# Patient Record
Sex: FEMALE
[De-identification: names, ages, dates, MRNs, and addresses within clinical notes are randomized per-mention and may not be internally consistent; named-entity substitution may affect disease eponyms.]

---

## 2017-01-30 ENCOUNTER — LAB SERVICES (OUTPATIENT)
Dept: OTHER | Age: 42
End: 2017-01-30

## 2017-01-30 ENCOUNTER — CHARTING TRANS (OUTPATIENT)
Dept: OTHER | Age: 42
End: 2017-01-30

## 2017-01-30 LAB — RAPID STREP GROUP A: POSITIVE

## 2018-09-28 ENCOUNTER — RECORDS - HEALTHEAST (OUTPATIENT)
Dept: ADMINISTRATIVE | Facility: OTHER | Age: 43
End: 2018-09-28

## 2018-10-16 ENCOUNTER — RECORDS - HEALTHEAST (OUTPATIENT)
Dept: ADMINISTRATIVE | Facility: OTHER | Age: 43
End: 2018-10-16

## 2018-11-05 VITALS — TEMPERATURE: 99.2 F | HEART RATE: 82 BPM | OXYGEN SATURATION: 98 %

## 2019-09-30 ENCOUNTER — RECORDS - HEALTHEAST (OUTPATIENT)
Dept: ADMINISTRATIVE | Facility: OTHER | Age: 44
End: 2019-09-30

## 2019-12-31 ENCOUNTER — COMMUNICATION - HEALTHEAST (OUTPATIENT)
Dept: TELEHEALTH | Facility: CLINIC | Age: 44
End: 2019-12-31

## 2019-12-31 ENCOUNTER — OFFICE VISIT - HEALTHEAST (OUTPATIENT)
Dept: FAMILY MEDICINE | Facility: CLINIC | Age: 44
End: 2019-12-31

## 2019-12-31 DIAGNOSIS — Z00.00 ROUTINE GENERAL MEDICAL EXAMINATION AT A HEALTH CARE FACILITY: ICD-10-CM

## 2019-12-31 DIAGNOSIS — J30.9 ALLERGIC RHINITIS, UNSPECIFIED SEASONALITY, UNSPECIFIED TRIGGER: ICD-10-CM

## 2019-12-31 DIAGNOSIS — E66.9 OBESITY (BMI 30-39.9): ICD-10-CM

## 2019-12-31 DIAGNOSIS — Z12.4 ENCOUNTER FOR SCREENING FOR CERVICAL CANCER: ICD-10-CM

## 2019-12-31 ASSESSMENT — MIFFLIN-ST. JEOR: SCORE: 1725.78

## 2020-01-02 ENCOUNTER — COMMUNICATION - HEALTHEAST (OUTPATIENT)
Dept: FAMILY MEDICINE | Facility: CLINIC | Age: 45
End: 2020-01-02

## 2020-01-02 DIAGNOSIS — R92.8 ABNORMAL MAMMOGRAM: ICD-10-CM

## 2020-01-02 LAB
HPV SOURCE: NORMAL
HUMAN PAPILLOMA VIRUS 16 DNA: NEGATIVE
HUMAN PAPILLOMA VIRUS 18 DNA: NEGATIVE
HUMAN PAPILLOMA VIRUS FINAL DIAGNOSIS: NORMAL
HUMAN PAPILLOMA VIRUS OTHER HR: NEGATIVE
SPECIMEN DESCRIPTION: NORMAL

## 2020-01-07 ENCOUNTER — COMMUNICATION - HEALTHEAST (OUTPATIENT)
Dept: FAMILY MEDICINE | Facility: CLINIC | Age: 45
End: 2020-01-07

## 2020-01-07 LAB
BKR LAB AP ABNORMAL BLEEDING: NO
BKR LAB AP BIRTH CONTROL/HORMONES: NORMAL
BKR LAB AP CERVICAL APPEARANCE: NORMAL
BKR LAB AP GYN ADEQUACY: NORMAL
BKR LAB AP GYN INTERPRETATION: NORMAL
BKR LAB AP HPV REFLEX: NORMAL
BKR LAB AP LMP: NORMAL
BKR LAB AP PATIENT STATUS: NORMAL
BKR LAB AP PREVIOUS ABNORMAL: NO
BKR LAB AP PREVIOUS NORMAL: NORMAL
HIGH RISK?: NO
PATH REPORT.COMMENTS IMP SPEC: NORMAL
RESULT FLAG (HE HISTORICAL CONVERSION): NORMAL

## 2020-01-29 ENCOUNTER — OFFICE VISIT - HEALTHEAST (OUTPATIENT)
Dept: FAMILY MEDICINE | Facility: CLINIC | Age: 45
End: 2020-01-29

## 2020-01-29 DIAGNOSIS — E66.811 CLASS 1 OBESITY DUE TO EXCESS CALORIES WITHOUT SERIOUS COMORBIDITY WITH BODY MASS INDEX (BMI) OF 33.0 TO 33.9 IN ADULT: ICD-10-CM

## 2020-01-29 DIAGNOSIS — Z23 NEED FOR VACCINATION: ICD-10-CM

## 2020-01-29 DIAGNOSIS — E66.09 CLASS 1 OBESITY DUE TO EXCESS CALORIES WITHOUT SERIOUS COMORBIDITY WITH BODY MASS INDEX (BMI) OF 33.0 TO 33.9 IN ADULT: ICD-10-CM

## 2020-01-29 LAB
ALBUMIN SERPL-MCNC: 3.9 G/DL (ref 3.5–5)
ALP SERPL-CCNC: 62 U/L (ref 45–120)
ALT SERPL W P-5'-P-CCNC: 14 U/L (ref 0–45)
ANION GAP SERPL CALCULATED.3IONS-SCNC: 9 MMOL/L (ref 5–18)
AST SERPL W P-5'-P-CCNC: 14 U/L (ref 0–40)
ATRIAL RATE - MUSE: 46 BPM
BILIRUB SERPL-MCNC: 0.5 MG/DL (ref 0–1)
BUN SERPL-MCNC: 16 MG/DL (ref 8–22)
CALCIUM SERPL-MCNC: 9.3 MG/DL (ref 8.5–10.5)
CHLORIDE BLD-SCNC: 105 MMOL/L (ref 98–107)
CHOLEST SERPL-MCNC: 162 MG/DL
CO2 SERPL-SCNC: 26 MMOL/L (ref 22–31)
CREAT SERPL-MCNC: 0.71 MG/DL (ref 0.6–1.1)
DIASTOLIC BLOOD PRESSURE - MUSE: NORMAL
ERYTHROCYTE [DISTWIDTH] IN BLOOD BY AUTOMATED COUNT: 11.4 % (ref 11–14.5)
FASTING STATUS PATIENT QL REPORTED: YES
GFR SERPL CREATININE-BSD FRML MDRD: >60 ML/MIN/1.73M2
GLUCOSE BLD-MCNC: 85 MG/DL (ref 70–125)
HBA1C MFR BLD: 5 % (ref 3.5–6)
HCT VFR BLD AUTO: 40.3 % (ref 35–47)
HDLC SERPL-MCNC: 54 MG/DL
HGB BLD-MCNC: 13.5 G/DL (ref 12–16)
INSULIN SERPL-ACNC: 4.7 MU/L (ref 3–25)
INTERPRETATION ECG - MUSE: NORMAL
LDLC SERPL CALC-MCNC: 99 MG/DL
MCH RBC QN AUTO: 32.8 PG (ref 27–34)
MCHC RBC AUTO-ENTMCNC: 33.6 G/DL (ref 32–36)
MCV RBC AUTO: 97 FL (ref 80–100)
P AXIS - MUSE: 16 DEGREES
PLATELET # BLD AUTO: 304 THOU/UL (ref 140–440)
PMV BLD AUTO: 7.7 FL (ref 7–10)
POTASSIUM BLD-SCNC: 4.8 MMOL/L (ref 3.5–5)
PR INTERVAL - MUSE: 202 MS
PROT SERPL-MCNC: 6.6 G/DL (ref 6–8)
QRS DURATION - MUSE: 86 MS
QT - MUSE: 428 MS
QTC - MUSE: 374 MS
R AXIS - MUSE: -6 DEGREES
RBC # BLD AUTO: 4.13 MILL/UL (ref 3.8–5.4)
SODIUM SERPL-SCNC: 140 MMOL/L (ref 136–145)
SYSTOLIC BLOOD PRESSURE - MUSE: NORMAL
T AXIS - MUSE: 1 DEGREES
TRIGL SERPL-MCNC: 47 MG/DL
TSH SERPL DL<=0.005 MIU/L-ACNC: 1.15 UIU/ML (ref 0.3–5)
VENTRICULAR RATE- MUSE: 46 BPM
WBC: 5.1 THOU/UL (ref 4–11)

## 2020-01-29 ASSESSMENT — MIFFLIN-ST. JEOR: SCORE: 1709.46

## 2020-01-30 LAB
25(OH)D3 SERPL-MCNC: 29.9 NG/ML (ref 30–80)
25(OH)D3 SERPL-MCNC: 29.9 NG/ML (ref 30–80)

## 2020-02-03 ENCOUNTER — AMBULATORY - HEALTHEAST (OUTPATIENT)
Dept: FAMILY MEDICINE | Facility: CLINIC | Age: 45
End: 2020-02-03

## 2020-02-03 ENCOUNTER — COMMUNICATION - HEALTHEAST (OUTPATIENT)
Dept: FAMILY MEDICINE | Facility: CLINIC | Age: 45
End: 2020-02-03

## 2020-02-03 DIAGNOSIS — E66.811 CLASS 1 OBESITY DUE TO EXCESS CALORIES WITHOUT SERIOUS COMORBIDITY WITH BODY MASS INDEX (BMI) OF 33.0 TO 33.9 IN ADULT: ICD-10-CM

## 2020-02-03 DIAGNOSIS — E66.09 CLASS 1 OBESITY DUE TO EXCESS CALORIES WITHOUT SERIOUS COMORBIDITY WITH BODY MASS INDEX (BMI) OF 33.0 TO 33.9 IN ADULT: ICD-10-CM

## 2020-03-18 ENCOUNTER — COMMUNICATION - HEALTHEAST (OUTPATIENT)
Dept: FAMILY MEDICINE | Facility: CLINIC | Age: 45
End: 2020-03-18

## 2020-03-20 ENCOUNTER — COMMUNICATION - HEALTHEAST (OUTPATIENT)
Dept: FAMILY MEDICINE | Facility: CLINIC | Age: 45
End: 2020-03-20

## 2020-03-23 ENCOUNTER — OFFICE VISIT - HEALTHEAST (OUTPATIENT)
Dept: SURGERY | Facility: CLINIC | Age: 45
End: 2020-03-23

## 2020-03-23 DIAGNOSIS — E66.9 OBESITY (BMI 30-39.9): ICD-10-CM

## 2020-03-23 ASSESSMENT — MIFFLIN-ST. JEOR: SCORE: 1696.41

## 2020-04-17 ENCOUNTER — RECORDS - HEALTHEAST (OUTPATIENT)
Dept: RADIOLOGY | Facility: CLINIC | Age: 45
End: 2020-04-17

## 2020-04-17 ENCOUNTER — RECORDS - HEALTHEAST (OUTPATIENT)
Dept: ADMINISTRATIVE | Facility: OTHER | Age: 45
End: 2020-04-17

## 2020-04-20 ENCOUNTER — HOSPITAL ENCOUNTER (OUTPATIENT)
Dept: MAMMOGRAPHY | Facility: CLINIC | Age: 45
Discharge: HOME | End: 2020-04-20
Attending: FAMILY MEDICINE

## 2020-04-20 DIAGNOSIS — R92.8 ABNORMAL MAMMOGRAM: ICD-10-CM

## 2021-02-01 ENCOUNTER — OFFICE VISIT - HEALTHEAST (OUTPATIENT)
Dept: FAMILY MEDICINE | Facility: CLINIC | Age: 46
End: 2021-02-01

## 2021-02-01 DIAGNOSIS — Z13.220 ENCOUNTER FOR SCREENING FOR LIPOID DISORDERS: ICD-10-CM

## 2021-02-01 DIAGNOSIS — Z12.31 VISIT FOR SCREENING MAMMOGRAM: ICD-10-CM

## 2021-02-01 DIAGNOSIS — Z00.00 ROUTINE GENERAL MEDICAL EXAMINATION AT A HEALTH CARE FACILITY: ICD-10-CM

## 2021-02-01 DIAGNOSIS — E66.9 OBESITY (BMI 30-39.9): ICD-10-CM

## 2021-02-01 DIAGNOSIS — Z13.1 ENCOUNTER FOR SCREENING FOR DIABETES MELLITUS: ICD-10-CM

## 2021-02-01 DIAGNOSIS — M67.40 GANGLION CYST: ICD-10-CM

## 2021-02-01 LAB
CHOLEST SERPL-MCNC: 156 MG/DL
FASTING STATUS PATIENT QL REPORTED: YES
FASTING STATUS PATIENT QL REPORTED: YES
GLUCOSE BLD-MCNC: 83 MG/DL (ref 70–99)
HDLC SERPL-MCNC: 54 MG/DL
HIV 1+2 AB+HIV1 P24 AG SERPL QL IA: NEGATIVE
LDLC SERPL CALC-MCNC: 91 MG/DL
TRIGL SERPL-MCNC: 57 MG/DL

## 2021-02-01 ASSESSMENT — MIFFLIN-ST. JEOR: SCORE: 1736.33

## 2021-02-02 LAB — HCV AB SERPL QL IA: NEGATIVE

## 2021-04-12 ENCOUNTER — HOSPITAL ENCOUNTER (OUTPATIENT)
Dept: MAMMOGRAPHY | Facility: CLINIC | Age: 46
Discharge: HOME | End: 2021-04-12
Attending: FAMILY MEDICINE

## 2021-04-12 ENCOUNTER — OFFICE VISIT - HEALTHEAST (OUTPATIENT)
Dept: FAMILY MEDICINE | Facility: CLINIC | Age: 46
End: 2021-04-12

## 2021-04-12 DIAGNOSIS — L98.9 FOOT LESION: ICD-10-CM

## 2021-04-12 DIAGNOSIS — E66.9 OBESITY (BMI 30-39.9): ICD-10-CM

## 2021-04-12 DIAGNOSIS — Z12.31 VISIT FOR SCREENING MAMMOGRAM: ICD-10-CM

## 2021-04-12 LAB
ATRIAL RATE - MUSE: 54 BPM
DIASTOLIC BLOOD PRESSURE - MUSE: NORMAL
INTERPRETATION ECG - MUSE: NORMAL
P AXIS - MUSE: 12 DEGREES
PR INTERVAL - MUSE: 194 MS
QRS DURATION - MUSE: 84 MS
QT - MUSE: 402 MS
QTC - MUSE: 381 MS
R AXIS - MUSE: -3 DEGREES
SYSTOLIC BLOOD PRESSURE - MUSE: NORMAL
T AXIS - MUSE: -18 DEGREES
VENTRICULAR RATE- MUSE: 54 BPM

## 2021-04-12 ASSESSMENT — MIFFLIN-ST. JEOR: SCORE: 1744.49

## 2021-04-13 ENCOUNTER — COMMUNICATION - HEALTHEAST (OUTPATIENT)
Dept: FAMILY MEDICINE | Facility: CLINIC | Age: 46
End: 2021-04-13

## 2021-04-13 DIAGNOSIS — E66.9 OBESITY (BMI 30-39.9): ICD-10-CM

## 2021-04-13 RX ORDER — PHENTERMINE HYDROCHLORIDE 15 MG/1
15 CAPSULE ORAL EVERY MORNING
Qty: 30 CAPSULE | Refills: 0 | Status: SHIPPED | OUTPATIENT
Start: 2021-04-13

## 2021-05-10 ENCOUNTER — OFFICE VISIT - HEALTHEAST (OUTPATIENT)
Dept: PODIATRY | Facility: CLINIC | Age: 46
End: 2021-05-10

## 2021-05-10 DIAGNOSIS — M72.2 PLANTAR FIBROMATOSIS: ICD-10-CM

## 2021-05-10 ASSESSMENT — MIFFLIN-ST. JEOR: SCORE: 1712.29

## 2021-05-27 VITALS — TEMPERATURE: 98 F | RESPIRATION RATE: 16 BRPM | HEIGHT: 69 IN | BODY MASS INDEX: 32.58 KG/M2 | WEIGHT: 220 LBS

## 2021-06-04 VITALS
HEIGHT: 68 IN | RESPIRATION RATE: 16 BRPM | HEART RATE: 58 BPM | SYSTOLIC BLOOD PRESSURE: 108 MMHG | BODY MASS INDEX: 33.65 KG/M2 | WEIGHT: 222 LBS | DIASTOLIC BLOOD PRESSURE: 64 MMHG

## 2021-06-04 VITALS
BODY MASS INDEX: 34.19 KG/M2 | HEIGHT: 68 IN | WEIGHT: 225.6 LBS | RESPIRATION RATE: 16 BRPM | HEART RATE: 76 BPM | DIASTOLIC BLOOD PRESSURE: 72 MMHG | SYSTOLIC BLOOD PRESSURE: 112 MMHG

## 2021-06-04 VITALS — HEIGHT: 68 IN | BODY MASS INDEX: 33.34 KG/M2 | WEIGHT: 220 LBS

## 2021-06-04 NOTE — PROGRESS NOTES
"FEMALE PREVENTATIVE EXAM    Assessment and Plan:       1. Routine general medical examination at a health care facility  -I had my medical assistant call, and patient needs \"bilateral mammogram additional views\" ordered.  Will reach out to our specialty  to see if we have a similar equivalent mammogram and order.    2. Allergic rhinitis, unspecified seasonality, unspecified trigger  Discussed with patient findings on exam, I do not think she has sinus infection, no antibiotics needed.  Discussed use of daily antihistamine, and she should restart nasal steroid.  Follow-up in 1 month if symptoms persist.    3. Obesity (BMI 30-39.9)  Counseled patient regarding individualized medical weight management, she was given intake packet, she will schedule I hour full appointment with me sometime in the next few months to discuss medical weight management.    4. Encounter for screening for cervical cancer   Pap smear collected today, will inform patient of results when we have them.  - Gynecologic Cytology (PAP Smear)     Next follow up:  Return in about 1 month (around 1/31/2020) for weight loss intake (1hr appt).    Immunization Review  Adult Imm Review: No immunizations due today  BMI: see above    I discussed the following with the patient:   Adult Healthy Living: Importance of regular exercise  Healthy nutrition  Weight loss referral options    I have had an Advance Directives discussion with the patient.    Subjective:   Chief Complaint: Xiomara Mireles is an 44 y.o. female, new to [facility] here for a preventative health visit.     HPI:  Additional concerns:     Patiently recently moved from [place] to [place] in September.  She had previously participated in a weight management program, had been on Qsymia.  However, since moving to [place] she has gained approximately 10 pounds.  She is interested in starting in some sort of weight management program.    Patient also had breast reduction surgery in " "May, 2019.  She had a mammogram done in fall, 2019 prior to leaving, and was told it was \"abnormal\" and was supposed to have another mammogram done, though that was never completed in [place].  She is wondering if she can have this mammogram done here in [place].  There is significant family history of breast cancer, though patient denies any fevers, chills, sweats, breast changes.    Patient was treated for sinus infection via telemetry doc around Thanksgiving time this year.  Her symptoms resolved, however over the last week or so, she has noted some increased pressure which has gotten better, though she continues to note ongoing nasal drainage.  No fevers.    Healthy Habits  Are you taking a daily aspirin? No  Do you typically exercising at least 40 min, 3-4 times per week?  Yes  Do you usually eat at least 4 servings of fruit and vegetables a day, include whole grains and fiber and avoid regularly eating high fat foods? NO  Have you had an eye exam in the past two years? Yes  Do you see a dentist twice per year? Yes  Do you have any concerns regarding sleep? No    Safety Screen  If you own firearms, are they secured in a locked gun cabinet or with trigger locks? The patient does not own any firearms  No data recorded    Review of Systems:  Please see above.  The rest of the review of systems are negative for all systems.     Pap History:   No - age 30-65 PAP every 3 years recommended  Cancer Screening       Status Date      PAP SMEAR Overdue 12/6/2001           Patient Care Team:  Provider, No Primary Care as PCP - General        History     Reviewed By Date/Time Sections Reviewed    Jessie Garcia MD 12/31/2019  3:03 PM Social Documentation    Jessie Garcia MD 12/31/2019  3:01 PM Surgical, Family    Estefanía Lozano CMA 12/31/2019  2:42 PM Tobacco            Objective:   Vital Signs:   Visit Vitals  /72 (Patient Site: Right Arm, Patient Position: Sitting, Cuff Size: Adult Large)   Pulse 76   Resp " "16   Ht 5' 8.25\" (1.734 m)   Wt (!) 225 lb 9.6 oz (102.3 kg)   LMP 12/18/2019 (Exact Date)   Breastfeeding No   BMI 34.05 kg/m           PHYSICAL EXAM  General Appearance: Alert, cooperative, no distress, appears stated age  Head: Normocephalic, without obvious abnormality, atraumatic  Eyes: PERRL, conjunctiva/corneas clear, EOM's intact  Ears: Normal TM's and external ear canals, both ears  Nose: Nares normal, septum midline,mucosa normal, no drainage  Sinus: no tenderness to palpation of frontal or maxillary sinuses bilaterally.  Throat: Lips, mucosa, and tongue normal; teeth and gums normal  Neck: Supple, symmetrical, trachea midline, no adenopathy;  thyroid: not enlarged, symmetric, no tenderness/mass/nodules;   Back: Symmetric, no curvature, ROM normal, no CVA tenderness  Lungs: Clear to auscultation bilaterally, respirations unlabored  Breasts: No breast masses, tenderness, asymmetry, or nipple discharge. Well healed incisions.  Heart: Regular rate and rhythm, no murmur.   Abdomen: Soft, non-tender, bowel sounds active all four quadrants,  no masses, no organomegaly  Pelvic:Normally developed genitalia with no external lesions or eruptions. Vagina and cervix show no lesions, inflammation, discharge or tenderness. No cystocele, No rectocele. Uterus normal, though exam somewhat limited due to body habitus.  No adnexal mass or tenderness.  Extremities: Extremities normal, atraumatic, no cyanosis or edema  Skin: Skin color, texture, turgor normal, no rashes or lesions  Lymph nodes: Cervical, supraclavicular, and axillary nodes normal  Neurologic: Alert.   Psych: Normal affect.  Does not appear anxious or depressed.        The ASCVD Risk score (Gabriela ARLYN Jr., et al., 2013) failed to calculate for the following reasons:    Cannot find a previous HDL lab    Cannot find a previous total cholesterol lab         Medication List      as of December 31, 2019  4:57 PM     You have not been prescribed any medications.   "       Additional Screenings Completed Today:

## 2021-06-04 NOTE — TELEPHONE ENCOUNTER
"----- Message from Merlyn Kuhn sent at 1/2/2020  8:54 AM CST -----  Regarding: FW: \"additional views b/l mammo\" order?  Usually the additional views is a diagnostic mammogram.   Thanks  Merlyn  ----- Message -----  From: Jessie Garcia MD  Sent: 12/31/2019   5:00 PM CST  To: [facility]  [facility]  Subject: \"additional views b/l mammo\" order?              Merlyn,    Patient was previously seen in [place].  She had an abnormal screening mammogram after breast reduction, and was told to do a another type of mammogram called \"additional views bilateral mammogram\".  Do we have an equivalent, and if so what should I order for her?    Thanks,  Jessie Garcia MD      "

## 2021-06-05 VITALS
HEIGHT: 68 IN | BODY MASS INDEX: 34.68 KG/M2 | SYSTOLIC BLOOD PRESSURE: 117 MMHG | HEART RATE: 52 BPM | DIASTOLIC BLOOD PRESSURE: 69 MMHG | WEIGHT: 228.8 LBS

## 2021-06-05 VITALS
WEIGHT: 230.6 LBS | SYSTOLIC BLOOD PRESSURE: 100 MMHG | HEIGHT: 68 IN | BODY MASS INDEX: 34.95 KG/M2 | HEART RATE: 57 BPM | DIASTOLIC BLOOD PRESSURE: 63 MMHG

## 2021-06-05 NOTE — PATIENT INSTRUCTIONS - HE
The Obesity Code by Dr. Jerome Le    Aim to eat between 10am and 6pm  2017-3331 calories per day  Aim for 75-125g of carbohydrates/day  Aim for 100-125g of protein/day

## 2021-06-05 NOTE — TELEPHONE ENCOUNTER
Got a fax from [facility]     Qsymia 3.75 mg not covered by insurance, insurance prefers phentermine    Patient last seen 01/29/19    Please review and advise.  Thank you so much!  Estefanía Lozano, CMA

## 2021-06-05 NOTE — PROGRESS NOTES
PATIENT INTAKE      ASSESSMENT:    1. Class 1 obesity due to excess calories without serious comorbidity with body mass index (BMI) of 33.0 to 33.9 in adult  Amb Referral to Bariatric Dietician    Comprehensive Metabolic Panel    Electrocardiogram Perform - Clinic    Glycosylated Hemoglobin A1c    HM2(CBC w/o Differential)    Insulin Assay    Lipid Profile    Thyroid Cascade    Vitamin D, Total (25-Hydroxy)   2. Need for vaccination  Tdap vaccine,  8yo or older,  IM         PLAN    We discussed obesity as a disease, an approach to treatment and metabolic factors.  Nutrition counseling reviewed.    Labs ordered and will review and discuss with the patient.    Body composition analyzer done and results reviewed with the patient.  Please see scanned results in chart.    Discussed with the patient that Qsymia is a pregnancy category X medication and that is is essential that a reliable form of birth control be used while taking this medication if the patient will be sexually active.  She expresses understanding.    Once I've reviewed all labs, will plan to prescribe Qsymia (unless cost is significant, discussed splitting medication to phentermine and topiramate) and discussed risks, benefits, potential side effects, and when to take the medication.  All questions were answered.    Recommend journaling and tracking food intake using either an online program such as myfitnesspal.com or loseit.com or tracking using a paper and pencil.  Advised paying particular attention to total carbohydrates, fiber, protein, calories, and fats, and added sugars.     Recommend increasing movement throughout the day--sitting less, moving more.  Will increase activity over time to reach a goal of at least 150 minutes of moderate exercise each week.    Behavior modification:  Cognitive restructuring exercises, journaling stressors, triggers for food cravings.    Dietary Intervention:  Reduced calorie, reduced carbohydrates, whole food  "diet.    Greater than 50% of this 50 minute visit was spent in counseling regarding obesity is a disease as well as the nutritional and exercise recommendations of our program as it pertains to the patient's own individual healthcare needs.    Patient instructed to follow up in 1 month.      This note has been dictated using voice recognition software. Any grammatical or context distortions are unintentional and inherent to the software      SUBJECTIVE:      Patient presents for treatment of chronic, comorbid conditions using intensive therapeutic lifestyle interventions including nutrition, physical activity, and behavior management.    Growing up, there were 5 kids in family.  Snacks were usually fruit.  Meals were usually meat and potatoes and \"hot dish\".  Infrequent desserts.  She was active in sports as a child.  In high school weighted ~150-180lb.  In high school, more grab and go type food due to busyness of activities. When she went to college, activities stopped, then when she had dorm food at college, and continued to gain weight throughout college.  She continued to gain weight until about age 27, weighed ~260-280lb. At that time, increased physical activity and portion control ([place] [employer] Healthy Eating), lost about 60-80 pounds over 9-12 months.      6-7 years ago did HCG Diet, weighed 250lb, down to 180lb, though weight loss did not last.        She weighed about 220lb prepregnancy, and son is now 2 years ago.  After delivery of son, she participated in weight loss program with physician at [facility].  Was on Qsymia for awhile and had success with weight loss, though, due to move to [place], needed to stop program and has not been on Qsymia since moving to [place] in Fall 2019.    Continues to feel she has hunger at night.  Since she moved in October 2019, felt like she gained about 8pounds since the move.      Tracks food intake, doing Healthy For Life meal plans to help with portion " control.  Has a [employer] bike at home, increasing workouts to 4 days a week.     Still struggles with feeling hunger at night.      Tracking meals, aiming 1300-1400calories currently and doing Healthy For Life    What would you like to weigh (goal weight):  180lb  At what age were you last at that weight:  21  Any previous prescription weight loss medication:  Yes Qsymia  Menses regular:  yes  Number of children:  1  Are you pregnant: no  Are you currently using contraception: not currently sexually active  Do you exercise regularly:  Yes  Any problems with exercise:  no  Do you eat nutritiously?  yes  Do you eat excessively?  no  Do you count calories?  yes  Do you snore at night or ever stop breathing? no  Have you been overweight all your life?  no and overweight most of adult life  If not, how long?  Adult life  Do you have a history of eating disorder?  No  Have you ever had or been treated for alcohol or other substance abuse/dependence:   No    Patient Active Problem List   Diagnosis     Allergic rhinitis due to animal hair and dander     Obesity (BMI 30-39.9)       History reviewed. No pertinent past medical history.    Past Surgical History:   Procedure Laterality Date     REDUCTION MAMMAPLASTY  2019     SKIN GRAFT Right 1980    hand       No current outpatient medications on file prior to visit.     No current facility-administered medications on file prior to visit.        No Known Allergies      Family History   Problem Relation Age of Onset     Hyperlipidemia Mother         ?     Hypertension Father      Breast cancer Sister      Cancer Sister         bazea sarcoma     Obesity Sister      Ovarian cancer Other         maternal great aunt     Breast cancer Cousin      Breast cancer Cousin      Family History also positive for  High Cholesterol and Obesity    Social History     Socioeconomic History     Marital status: Single     Spouse name: None     Number of children: None     Years of education: None      Highest education level: None   Occupational History     None   Social Needs     Financial resource strain: None     Food insecurity:     Worry: None     Inability: None     Transportation needs:     Medical: None     Non-medical: None   Tobacco Use     Smoking status: Never Smoker     Smokeless tobacco: Never Used   Substance and Sexual Activity     Alcohol use: Yes     Frequency: 2-4 times a month     Drinks per session: 1 or 2     Drug use: Never     Sexual activity: Not Currently   Lifestyle     Physical activity:     Days per week: None     Minutes per session: None     Stress: None   Relationships     Social connections:     Talks on phone: None     Gets together: None     Attends Restorationist service: None     Active member of club or organization: None     Attends meetings of clubs or organizations: None     Relationship status: None     Intimate partner violence:     Fear of current or ex partner: None     Emotionally abused: None     Physically abused: None     Forced sexual activity: None   Other Topics Concern     None   Social History Narrative    Lives with 2 year old son.  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  Pertinent items are noted in HPI.  Patient denies chest pain or pressure, shortness of breath, exertional intolerance, palpitations, or lightheadedness.      Vitals:    01/29/20 0742   BP: 108/64   Pulse: (!) 58   Resp: 16     Initial Weight: 222 lbs  Weight: 222 lb (100.7 kg)  Weight loss from initial: 0  % Weight loss: 0 %  Waist Circumference: 47 inches    Body mass index is 33.51 kg/m .    EXAM                    General   Appearance:  Alert, pleasant, cooperative, no distress, appears stated age, patient is obese   Neck:   Supple, symmetrical, trachea midline, no adenopathy;     thyroid:  no enlargement/tenderness/nodules   Lungs:     Clear to auscultation bilaterally without wheezes, rales, or rhonchi, respirations  unlabored    Heart:    Regular rate and rhythm, S1 and S2 normal, no murmur, rub   or gallop                                 Abdomen:  Soft, nontender, nondistended. No hepatosplenomegaly.          Extremities:  Warm, well perfused, no edema.           Skin:  Skin color, texture, and turgor normal.  No skin tags, striae, hirsutism, or acanthosis nigricans    Body composition analyzer done and results reviewed with the patient.  Please see scanned results in chart.  Labs ordered and will review and discuss with the patient.

## 2021-06-06 NOTE — TELEPHONE ENCOUNTER
Called and spoke with patient, changed to a phone visit due to COVID19.    Patient advised to have BP checked and pulse with numbers available, and to check her weight at home the morning of this call.    Patient expressed understanding and scheduled appointment for phone visit at the same time.    Estefanía Lozano, [facility]

## 2021-06-07 NOTE — TELEPHONE ENCOUNTER
Patient Returning Call  Reason for call:  Return call   Information relayed to patient:  Caller was informed of message below.   Patient has additional questions:  Yes  If YES, what are your questions/concerns:  Caller stated that she would like to cancel office and phone visit. Will call back to reschedule.   Okay to leave a detailed message?: No call back needed

## 2021-06-07 NOTE — PROGRESS NOTES
"    Xiomara Mireles is a 44 y.o. female who is being evaluated via a billable telephone visit.      The patient has been notified of following:     \"This telephone visit will be conducted via a call between you and your physician/provider. We have found that certain health care needs can be provided without the need for a physical exam.  This service lets us provide the care you need with a short phone conversation.  If a prescription is necessary we can send it directly to your pharmacy.  If lab work is needed we can place an order for that and you can then stop by our lab to have the test done at a later time.    If during the course of the call the physician/provider feels a telephone visit is not appropriate, you will not be charged for this service.\"     Xiomara Mireles complains of  No chief complaint on file.      I have reviewed and updated the patient's Past Medical History, Social History, Family History and Medication List.    ALLERGIES  Patient has no known allergies.    Additional provider notes:     Medical  Weight Loss Initial Diet Evaluation  Xiomara is presenting today for a new weight management nutrition consultation. Pt has had an initial appointment with Dr. Garcia.  Weight loss medication: Phentermine.  Progress: Met with a doctor and dietitian every 2-3 months when started the process in [place]. Has been tracking, and does better not cooking for herself. Rather using frozen meals to keep portions in check. She is focusing on protein rich foods.   Nutrition Assessment:   Anthropometrics:  Pt's No data recorded  BMI: There were no vitals filed for this visit.   IBW: 140  Estimated RMR ([place]-St Jeor equation): 1704 kcal   Recommended Protein Intake:  grams of protein/day  Medical History:  Patient Active Problem List   Diagnosis     Allergic rhinitis due to animal hair and dander     Obesity (BMI 30-39.9)      Diabetes: No  Nutrition History:   Food allergies/intolerances/cultural " or religous food customs: Does not eat beans  Weight loss history: She started seeing a provider for weight loss when she was in [place]. She recently moved to the area and would like to continue being followed for weight management.  Dr. Garcia introduced her to an IF strategy of eating between 10 am-6 pm. She has been giving this a try recently    Dietary Recall: (Typical day)  Breakfast: Egg frittata cup and zuchinni muffin  Lunch: Lean cuisine or Healthy choice; today will have bagged salad  Dinner: Left overs (like bagged salad); dinner varies  Snacks: Power protein bar (20 g protein)  Aims for 6655-5486 kcals; up to 100 g of protein (this goal was set with Dr. Garcia);  carbs  Recently has been trying to eliminate more of the artificial sweeteners   Hydration (type/oz. per day):  Water: drinks water daily - recommended a goal of 7-9 cups daily  Caffeine: 1-2 cups; tea   Juice: None  Alcohol : None; on occasion - socially  Exercise:  Routine exercise established: Yes  30 minutes cardio; uses peloton   Nutrition Diagnosis (PES statement):   Overweight/Obesity (NC 3.3) related to overeating and poor lifestyle habits as evidenced by patient report of large portions and BMI 33.5  Nutrition Intervention:  1. Food and/or Nutrient Delivery   a. Placed emphasis on importance of developing a healthy meal routine, aiming for 3 meals a day and no snacks.  b. Reviewed set goals of 6545-3163 calories, about 100 g of protein,  g of carbohydrate  2. Nutrition Education   a. Educated on sources of lean protein, portion sizes, the amount of grams found in each source. Recommend patient to aim for 20-30g protein at each meal.  b. Discussed the importance of adequate hydration, with emphasis on drinking 56-72 oz of water or zero calorie beverages per day.  3. Nutrition Counseling   a. Encouraged importance of developing routine exercise for health benefits and weight loss - specifically discussed the benefit to  incorporating strengthening exercise into routine    Goals established by patient:   1. Aim for 1372-2026 calories (tracking in Lose It), ~100 g of protein,  g of carbohydrate  2. 56-72 oz of calorie free beverages daily  3. Continue with exercise routine; consider adding in strengthening exercise    Assessment/Plan:  1. Obesity (BMI 30-39.9)      Pt will follow up in 1-2 month(s) with bariatrician and 2 month(s) with dietitian.     Phone call duration: 30 minutes    Rosa Maria Eid RD

## 2021-06-16 PROBLEM — E66.9 OBESITY (BMI 30-39.9): Status: ACTIVE | Noted: 2018-07-12

## 2021-06-16 PROBLEM — M72.2 PLANTAR FIBROMATOSIS: Status: ACTIVE | Noted: 2021-05-10

## 2021-06-16 NOTE — PROGRESS NOTES
Assessment / Impression     1. Obesity (BMI 30-39.9)  Electrocardiogram Perform - Clinic   2. Foot lesion  Ambulatory referral to Podiatry - [facility] (includes [facility] groups)           Plan:     Discussed with patient options, including continuing lifestyle changes, or possibly adding medication.  She is interested in adding Qsymia, she had previously been on that, though wants to check with her insurance regarding cost.  If it is too expensive, we will plan to start phentermine 15 mg daily.  Discussed with patient possible adverse effects of both phentermine and Qsymia.  She will send me a note via [email or web address] to let me know what she decides to do, we will plan to follow-up in 1 month for weight check.    Foot lesion: Possible ganglion cyst.  Given patient does feel it is getting slightly harder, recommend referral to podiatry for further evaluation, possible removal.    Follow up in 1 month.  Continue medications.  This month concentrate on journaling    Subjective:      HPI: Xiomara Mireles is a 45 y.o. female who presents for weight loss follow up.  She briefly saw me for medical weight management a little over a year ago, and I saw her for physical a couple months ago, at that time she was trying whole 30 diet and wanted to do lifestyle changes alone to see if she could lose weight.  She tells me in general she just feels hungrier especially during the day.  She has gradually changed the quality of her snacks to healthier foods.  She would like to go back to intermittent fasting, though she usually wakes up at 5-6 AM and therefore is hungry by around 7am.  Breakfast is usually a protein bar.  She has tried to eat fruit such as increasing in a salad for lunch, will cook dinner for she and her son.    She also notes ongoing lesion on the plantar aspect of her left foot.  Again, not painful though she feels it is getting slightly harder.    Are you experiencing any side effects to the medications:   "n/a  Hunger control:  poor  Exercise was discussed: yes  Taking supplements:  n/a  Discussed journaling food:  no  Patient is pleased with the current results:  no  The patient is following the nutrition plan:  no  Barriers to losing weight:  Behavior:  Amnesia Calories:   habit    Medical History:     Patient Active Problem List   Diagnosis     Allergic rhinitis due to animal hair and dander     Obesity (BMI 30-39.9)       No past medical history on file.    Past Surgical History:   Procedure Laterality Date     REDUCTION MAMMAPLASTY  2019     SKIN GRAFT Right 1980    hand       Current Medications:     No current outpatient medications on file.       Family History:     Family History   Problem Relation Age of Onset     Hyperlipidemia Mother         ?     Hypertension Father      Breast cancer Sister 35     Cancer Sister         baeza sarcoma     Obesity Sister      Ovarian cancer Other         maternal great aunt     Breast cancer Cousin 35        Mcous     Breast cancer Cousin 45        Pcous       Review of Systems  All other systems reviewed and are negative.         Social History:     Social History     Tobacco Use   Smoking Status Never Smoker   Smokeless Tobacco Never Used     Social History     Social History Narrative    Lives with 3 year old son.  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         Objective:     /63 (Patient Site: Left Arm, Patient Position: Sitting, Cuff Size: Adult Large)   Pulse (!) 57   Ht 5' 8\" (1.727 m)   Wt (!) 230 lb 9.6 oz (104.6 kg)   Breastfeeding No   BMI 35.06 kg/m      Weight: (!) 230 lb 9.6 oz (104.6 kg)      Physical Examination: General appearance - alert, well appearing, and in no distress  Eyes: pupils equal and reactive, extraocular eye movements intact  Neck: supple, no significant adenopathy or thyromegaly  Lungs: clear to auscultation, no wheezes, rales or rhonchi, symmetric air entry  Heart: normal rate, regular rhythm, normal S1, S2, no " murmurs.  Neurological: alert, oriented, normal speech, no focal findings or movement disorder noted.    Extremities: No edema, no clubbing or cyanosis.  On plantar aspect of her left foot, there is an approximately 6 mm mobile papule, translucent.  Psychiatric: Normal affect. Does not appear anxious or depressed.    EKG: by my interpretation, sinus bradycardia at 54 bpm, otherwise normal (cardiology read pending)      Jessie Garcia MD  4/12/2021  2:19 PM

## 2021-06-17 NOTE — PATIENT INSTRUCTIONS - HE
Patient Instructions by Jessie Garcia MD at 12/31/2019  2:50 PM     Author: Jessie Gacria MD Service: -- Author Type: Physician    Filed: 12/31/2019  4:27 PM Encounter Date: 12/31/2019 Status: Signed    : Jessie Garcia MD (Physician)         Patient Education   Understanding USDA MyPlate  The USDA (US Department of Agriculture) has guidelines to help you make healthy food choices. These are called MyPlate. MyPlate shows the food groups that make up healthy meals using the image of a place setting. Before you eat, think about the healthiest choices for what to put onto your plate or into your cup or bowl. To learn more about building a healthy plate, visit www.choosemyplate.gov.       The Food Groups    Fruits: Any fruit or 100% fruit juice counts as part of the Fruit Group. Fruits may be fresh, canned, frozen, or dried, and may be whole, cut-up, or pureed. Make half your plate fruits and vegetables.    Vegetables: Any vegetable or 100% vegetable juice counts as a member of the Vegetable Group. Vegetables may be fresh, frozen, canned, or dried. They can be served raw or cooked and may be whole, cut-up, or mashed. Make half your plate fruits and vegetables.     Grains: All foods made from grains are part of the Grains Group. These include wheat, rice, oats, cornmeal, and barley such as bread, pasta, oatmeal, cereal, tortillas, and grits. Grains should be no more than a quarter of your plate. At least half of your grains should be whole grains.    Protein: This group includes meat, poultry, seafood, beans and peas, eggs, processed soy products (like tofu), nuts (including nut butters), and seeds. Make protein choices no more than a quarter of your plate. Meat and poultry choices should be lean or low fat.    Dairy: All fluid milk products and foods made from milk that contain calcium, like yogurt and cheese are part of the Dairy Group. (Foods that have little calcium, such as cream, butter, and cream  cheese, are not part of the group.) Most dairy choices should be low-fat or fat-free.    Oils: These are fats that are liquid at room temperature. They include canola, corn, olive, soybean, and sunflower oil. Foods that are mainly oil include mayonnaise, certain salad dressings, and soft margarines. You should have only 5 to 7 teaspoons of oils a day. You probably already get this much from the food you eat.  Use [employer] to Help Build Your Meals  The [employer] can help you plan and track your meals and activity. You can look up individual foods to see or compare their nutritional value. You can get guidelines for what and how much you should eat. You can compare your food choices. And you can assess personal physical activities and see ways you can improve. Go to www.Caspida.gov/supertracker/.    2356-1744 [employer]. [address] [place] [address], [place] [address]. All rights reserved. This information is not intended as a substitute for professional medical care. Always follow your healthcare professional's instructions.

## 2021-06-17 NOTE — PROGRESS NOTES
FOOT AND ANKLE SURGERY/PODIATRY CONSULT NOTE         ASSESSMENT: Plantar Fibroma left foot      TREATMENT:  -Semi-mobile soft tissue mass along the plantar left foot along the medial band of the plantar fascia which is consistent with a plantar fibroma.     -I reviewed available treatment options including offloading with orthotics, steroid injections, and wide surgical excision. Reviewed that local excision can lead to recurrence and multiple lesion.    -All questions invited and answered. Because the lesions is not painful at this time, she would like to continue to monitor. She will follow-up with me as concerns develop.    ALEXANDRO [facility] Podiatry/Foot & Ankle Surgery      HPI: I was asked to see Xiomara Mireles today by Dr. Garcia for a soft tissue mass on her left foot which she noticed about one year ago. She denies pain associated with the lesion but would like to discuss her options.      No past medical history on file.    Past Surgical History:   Procedure Laterality Date     REDUCTION MAMMAPLASTY  2019     SKIN GRAFT Right 1980    hand       No Known Allergies      Current Outpatient Medications:      phentermine 15 MG capsule, Take 1 capsule (15 mg total) by mouth every morning., Disp: 30 capsule, Rfl: 0    Family History   Problem Relation Age of Onset     Hyperlipidemia Mother         ?     Hypertension Father      Breast cancer Sister 35     Cancer Sister         baeza sarcoma     Obesity Sister      Ovarian cancer Other         maternal great aunt     Breast cancer Cousin 35        Mcous     Breast cancer Cousin 45        Pcous       Social History     Socioeconomic History     Marital status: Single     Spouse name: Not on file     Number of children: Not on file     Years of education: Not on file     Highest education level: Not on file   Occupational History     Not on file   Social Needs     Financial resource strain: Not on file     Food insecurity     Worry: Not on  file     Inability: Not on file     Transportation needs     Medical: Not on file     Non-medical: Not on file   Tobacco Use     Smoking status: Never Smoker     Smokeless tobacco: Never Used   Substance and Sexual Activity     Alcohol use: Yes     Frequency: 2-4 times a month     Drinks per session: 1 or 2     Drug use: Never     Sexual activity: Not Currently   Lifestyle     Physical activity     Days per week: Not on file     Minutes per session: Not on file     Stress: Not on file   Relationships     Social connections     Talks on phone: Not on file     Gets together: Not on file     Attends Restorationism service: Not on file     Active member of club or organization: Not on file     Attends meetings of clubs or organizations: Not on file     Relationship status: Not on file     Intimate partner violence     Fear of current or ex partner: Not on file     Emotionally abused: Not on file     Physically abused: Not on file     Forced sexual activity: Not on file   Other Topics Concern     Not on file   Social History Narrative    Lives with 3 year old son.  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    OBJECTIVE:  Appearance: alert, well appearing, and in no distress.    Vitals:    05/10/21 0921   Resp: 16   Temp: 98  F (36.7  C)       BMI= Body mass index is 32.49 kg/m .    General appearance: Patient is alert and fully cooperative with history & exam.  No sign of distress is noted during the visit.     Psychiatric: Affect is pleasant & appropriate.  Patient appears motivated to improve health.     Respiratory: Breathing is regular & unlabored while sitting.     HEENT: Hearing is intact to spoken word.  Speech is clear.  No gross evidence of visual impairment that would impact ambulation.      Vascular: Dorsalis pedis and posterior tibial pulses are palpable. There is pedal hair growth left foot.   CFT < 3 sec from anterior tibial surface to distal digits left. There is no appreciable edema noted.  Dermatologic: Turgor and texture are within normal limits. No coloration or temperature changes. No primary or secondary lesions noted.  Neurologic: All epicritic and proprioceptive sensations are grossly intact left.  Musculoskeletal: Soft tissue prominence, firm semi-mobile mass along the plantar medial left foot along the medial band of the plantar fascia, no pain to palpation.     Imaging:     Mammo Screening Bilateral    Result Date: 4/12/2021  BILATERAL FULL FIELD DIGITAL SCREENING MAMMOGRAM Performed on: 4/12/21 Compared to: 04/20/2020 Mammo Diagnostic Left, 04/20/2020 US Breast Left Limited 1-3 Quadrants, and 09/30/2019 [facility] External Imaging 3D Screening Findings: The breasts have scattered areas of fibroglandular densities. There is no radiographic evidence of malignancy. This study was evaluated with the assistance of Computer-Aided Detection. Continue routine screening mammogram as recommended. There are changes of breast reduction of the left and right breasts. ACR BI-RADS Category 2: Benign

## 2021-06-20 NOTE — LETTER
Letter by Jessie Garcia MD at      Author: Jessie Garcia MD Service: -- Author Type: --    Filed:  Encounter Date: 1/7/2020 Status: Signed         Xiomara Mireles  [address]  [place] [address]               January 7, 2020      Dear Xiomara:    The results of your most recent Pap smear are normal. This means that no cancerous or precancerous cells were seen. We recommend that you come back in 5 years for your next routine Pap smear.    Please call with questions or contact us using [employer].    Sincerely,        Jessie Garcia MD

## 2021-06-30 NOTE — PROGRESS NOTES
Progress Notes by Jessie Garcia MD at 2/1/2021  9:40 AM     Author: Jessie Garcia MD Service: -- Author Type: Physician    Filed: 2/1/2021  2:28 PM Encounter Date: 2/1/2021 Status: Signed    : Jessie Garcia MD (Physician)       FEMALE PREVENTATIVE EXAM    Assessment and Plan:     Patient has been advised of split billing requirements and indicates understanding: Yes    1. Routine general medical examination at a health care facility  Discussed one-time HIV and hepatitis C screening, ordered today.  - HIV Antigen/Antibody Screening Cascade  - Hepatitis C Antibody (Anti-HCV) (pts born [id number])    2. Ganglion cyst  Discussed with patient that lesion on bottom of her foot appears to be most consistent with ganglion cyst, especially given it is nontender and general appearance.  She may continue to monitor symptoms, but does increase in size or become bothersome, consider referral to podiatry.    3. Obesity (BMI 30-39.9)  Discussed with patient the option of seeing me again for medical weight management.  She will hold off for now.  Plan to follow-up with me in 2 to 3 months to see how her weight loss is going.    4. Encounter for screening for diabetes mellitus  5. Encounter for screening for lipoid disorders  Fasting today, will check labs.  - Glucose  - Lipid Cascade- FASTING    6. Visit for screening mammogram  Discussed with patient, ordered today.  - Mammo Screening Bilateral; Future     Next follow up:  Return in about 2 months (around 4/1/2021), or if symptoms worsen or fail to improve, for Weight Loss Follow Up.    Immunization Review  Adult Imm Review: No immunizations due today  BMI: see above      I discussed the following with the patient:   Adult Healthy Living: Importance of regular exercise  Healthy nutrition  Weight loss referral options      Subjective:   Chief Complaint: Xiomara Mireles is an 45 y.o. female here for a preventative health visit.    Patient has been advised of split  billing requirements and indicates understanding: Yes  HPI: Patient moved to [place] in November.    About 6 months ago when she was doing more running, she noted a bump on the bottom of her left foot.  His gotten slightly larger since she initially noticed it.  No pain associated with it.      She recently finished whole 30, felt like she was able to maintain that diet and felt better when she was on it.  Not quite sure she would like to restart medications for weight management.    Healthy Habits  Are you taking a daily aspirin? No  Do you typically exercising at least 40 min, 3-4 times per week?  Yes  Do you usually eat at least 4 servings of fruit and vegetables a day, include whole grains and fiber and avoid regularly eating high fat foods? Yes  Have you had an eye exam in the past two years? Yes  Do you see a dentist twice per year? Yes  Do you have any concerns regarding sleep? No    Safety Screen  If you own firearms, are they secured in a locked gun cabinet or with trigger locks? The patient does not own any firearms  Do you feel you are safe where you are living?: Yes (2/1/2021  9:42 AM)  Do you feel you are safe in your relationship(s)?: Yes (2/1/2021  9:42 AM)      Review of Systems:  Please see above.  The rest of the review of systems are negative for all systems.     Pap History:   No - age 30-65 PAP every 3 years recommended  Cancer Screening       Status Date      PAP SMEAR Next Due 12/31/2024      Done 12/31/2019 GYNECOLOGIC CYTOLOGY (PAP SMEAR)          Patient Care Team:  Mark Naranjo MD as PCP - General (Family Medicine)  Jessie Garcia MD as Assigned PCP        History     Reviewed By Date/Time Sections Reviewed    Jessie Garcia MD 2/1/2021 10:01 AM Medical, Surgical, Jessie Myers MD 2/1/2021  9:59 AM Medical, Surgical, Jessie Myers MD 2/1/2021  9:52 AM Social Documentation    Alta Elizabeth CMA 2/1/2021  9:42 AM Tobacco            Objective:   Vital  "Signs:   Visit Vitals  /69 (Patient Site: Left Arm, Patient Position: Sitting, Cuff Size: Adult Regular)   Pulse (!) 52   Ht 5' 8\" (1.727 m)   Wt (!) 228 lb 12.8 oz (103.8 kg)   LMP 01/16/2021   Breastfeeding No   BMI 34.79 kg/m           PHYSICAL EXAM  General Appearance: Alert, cooperative, no distress, appears stated age  Head: Normocephalic, without obvious abnormality, atraumatic  Eyes: PERRL, conjunctiva/corneas clear, EOM's intact  Ears: Normal TM's and external ear canals, both ears  Nose: Nares normal, septum midline,mucosa normal, no drainage  Throat: Lips, mucosa, and tongue normal; teeth and gums normal  Neck: Supple, symmetrical, trachea midline, no adenopathy;  thyroid: not enlarged, symmetric, no tenderness/mass/nodules;   Back: Symmetric, no curvature, ROM normal, no CVA tenderness  Lungs: Clear to auscultation bilaterally, respirations unlabored  Breasts: No breast masses, tenderness, asymmetry, or nipple discharge.  Heart: Regular rate and rhythm, no murmur.   Abdomen: Soft, non-tender, bowel sounds active all four quadrants,  no masses, no organomegaly  Pelvic:Not examined  Extremities: Extremities normal, atraumatic, no cyanosis or edema.  On plantar aspect of her left foot, there is an approximately 6 mm mobile papule, translucent.  Skin: Skin color, texture, turgor normal, no rashes or lesions  Lymph nodes: Cervical, supraclavicular, and axillary nodes normal  Neurologic: Alert.   Psych: Normal affect.  Does not appear anxious or depressed.        The 10-year ASCVD risk score (Gabriela ARLYN Jr., et al., 2013) is: 0.5%    Values used to calculate the score:      Age: 45 years      Sex: Female      Is Non- : No      Diabetic: No      Tobacco smoker: No      Systolic Blood Pressure: 117 mmHg      Is BP treated: No      HDL Cholesterol: 54 mg/dL      Total Cholesterol: 162 mg/dL         Medication List          Accurate as of February 1, 2021  2:23 PM. If you have any " questions, ask your nurse or doctor.            STOP taking these medications    phentermine 15 MG capsule  Stopped by: Jessie Garcia MD            Additional Screenings Completed Today:

## 2021-10-16 ENCOUNTER — HEALTH MAINTENANCE LETTER (OUTPATIENT)
Age: 46
End: 2021-10-16

## 2022-04-02 ENCOUNTER — HEALTH MAINTENANCE LETTER (OUTPATIENT)
Age: 47
End: 2022-04-02

## 2022-07-23 ENCOUNTER — HEALTH MAINTENANCE LETTER (OUTPATIENT)
Age: 47
End: 2022-07-23

## 2022-10-01 ENCOUNTER — HEALTH MAINTENANCE LETTER (OUTPATIENT)
Age: 47
End: 2022-10-01

## 2023-05-14 ENCOUNTER — HEALTH MAINTENANCE LETTER (OUTPATIENT)
Age: 48
End: 2023-05-14

## 2023-08-06 ENCOUNTER — HEALTH MAINTENANCE LETTER (OUTPATIENT)
Age: 48
End: 2023-08-06